# Patient Record
Sex: FEMALE | Race: BLACK OR AFRICAN AMERICAN | NOT HISPANIC OR LATINO | URBAN - METROPOLITAN AREA
[De-identification: names, ages, dates, MRNs, and addresses within clinical notes are randomized per-mention and may not be internally consistent; named-entity substitution may affect disease eponyms.]

---

## 2020-08-31 ENCOUNTER — IMPORTED ENCOUNTER (OUTPATIENT)
Dept: URBAN - METROPOLITAN AREA CLINIC 31 | Facility: CLINIC | Age: 40
End: 2020-08-31

## 2020-08-31 PROCEDURE — 92015 DETERMINE REFRACTIVE STATE: CPT

## 2020-08-31 PROCEDURE — 92004 COMPRE OPH EXAM NEW PT 1/>: CPT

## 2020-08-31 PROCEDURE — 92250 FUNDUS PHOTOGRAPHY W/I&R: CPT

## 2020-08-31 NOTE — PATIENT DISCUSSION
1.  Refractive error - can update glasses. 2. Optic nerve head pigmentation OS - was noted in past per pt and described as a tattoo. 3. Return for an appointment in 1 year for comprehensive exam. and Optos with Dr. Wero Centeno.

## 2022-04-01 ASSESSMENT — VISUAL ACUITY
OS_SC: 20/30
OU_SC: 20/25-2
OU_CC: J1+
OD_SC: 20/25

## 2022-04-01 ASSESSMENT — TONOMETRY
OS_IOP_MMHG: 17
OD_IOP_MMHG: 18